# Patient Record
Sex: MALE | Race: WHITE | URBAN - METROPOLITAN AREA
[De-identification: names, ages, dates, MRNs, and addresses within clinical notes are randomized per-mention and may not be internally consistent; named-entity substitution may affect disease eponyms.]

---

## 2023-06-20 ENCOUNTER — OFFICE VISIT (OUTPATIENT)
Dept: URBAN - METROPOLITAN AREA CLINIC 46 | Facility: CLINIC | Age: 58
End: 2023-06-20
Payer: COMMERCIAL

## 2023-06-20 DIAGNOSIS — H02.834 DERMATOCHALASIS OF LEFT UPPER LID: ICD-10-CM

## 2023-06-20 DIAGNOSIS — H16.143 PUNCTATE KERATITIS, BILATERAL: ICD-10-CM

## 2023-06-20 DIAGNOSIS — H52.4 PRESBYOPIA: ICD-10-CM

## 2023-06-20 DIAGNOSIS — H25.13 AGE-RELATED NUCLEAR CATARACT, BILATERAL: ICD-10-CM

## 2023-06-20 DIAGNOSIS — H02.831 DERMATOCHALASIS OF RIGHT UPPER LID: ICD-10-CM

## 2023-06-20 DIAGNOSIS — H16.223 KERATOCONJUNCTIVITIS SICCA, BILATERAL: Primary | ICD-10-CM

## 2023-06-20 PROCEDURE — 99204 OFFICE O/P NEW MOD 45 MIN: CPT | Performed by: OPTOMETRIST

## 2023-06-20 ASSESSMENT — INTRAOCULAR PRESSURE
OD: 12
OS: 12

## 2023-06-20 ASSESSMENT — VISUAL ACUITY
OS: 20/20
OD: 20/20

## 2023-06-20 NOTE — IMPRESSION/PLAN
Impression: Keratoconjunctivitis sicca, bilateral: Y16.432. Plan: Dry eyes account for the patient's complaints. There is no evidence of permanent changes to the cornea. Explained condition does not have a cure and will need artificial tears for maintenance, recommended to use 4-6 times a day.

## 2023-06-20 NOTE — IMPRESSION/PLAN
Impression: Age-related nuclear cataract, bilateral: H25.13. Plan: Patient advised there is a cataract, but that visual functioning is good. We will continue to monitor and they are advised to call or return if any new symptoms. [facility] Urology Progress Note     Carlos Tenorio is a 69 y.o. male with history hypogonadism, kidney stones, prostate cancer, RCC, spermatocele, and ED who presents for urologic follow up     He had been on testosterone replacement therapy previously (off x 5 yrs at time of initial eval) and was referred to me initially for finding of renal mass found on workup for LINSEY after episode of severe colitis and dehydration. A PSA checked at the time of his colon infection was found to be elevated at 7.5, which delayed resuming his testosterone therapy for his hypogonadism. Recheck of PSA in June 2017 was 2.2 with 19% free, though TRT deferred until recovery from partial nephrectomy for the renal mass found as above.       He underwent left robotic partial nephrectomy, with concurrent left renal cyst decortication on 8/2/17 for an upper pole 2-3cm exophytic enhancing renal mass, adjacent to larger simple renal cyst. Pathology: [id number] clear cell RCC. 2.5cm. Negative margins. Grade 2. No sarcomatoid or rhabdoid features. Benign renal cyst     Also has a history of calcium oxalate stones and uric acid stones in the past and has had prior bilateral ESWL 10 years prior.    By report, a 24-hour urine in October 2012 had high oxalate and low urine pH. Takes allopurinol.     He did also have large spermatocele on the right, noted to be 5.1 cm on 4/20/17 ultrasound, and previously decreased in size to almost nothing after resuming TRT, which he did at 200mg Q2 wks 1 month after his partial nephrectomy     Surveillance CT 2/5/18 for his T1a resected RCC performed (as well as screening for nephrolithiasis) noting punctate R upper pole and 4mm R lower pole stone, no evidence recurrence. CXR June 2017 negative. (previous CT stone protocol 5/24/17: 3 stones the largest of which is present within the lower pole and measures 3 mm.)  For interim doubling of his PSA from 2.2 to 4.4 after first 3 months of TRT with interval rise to 4.9  and recommended prostate biopsy.  TRUS bx 2/27/18. Vol 56.6. Path 14 cores benign. Elected to try Viagra again for his ED, as had not been using it properly/on empty stomach previously. Resumed TRT  6/19/18: PSA 3.9, good energy and libido with TRT and great erections without viagra. 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 No worsening LUTS     PSA then lisa on TRT again to 4.5 in Dec 2018, so send confirmMDx of 2/18 biopsy which was +LTZ indicating 28% chance finding prostate ca (19% low grade, 10% G7+)   3T MRI at [facility] 12/26/18: 58mL prostate with 3mm intravesical extension   PIRAD-3 index lesion: 8x6mm posterior lateral peripheral zone of mid gland on right. 14mm from midline and 2mm from capsule. No evidence of EPE.  - heterogenous on T2 with indistinct margins with no masslike lesion but focal moderately hypointense on ADC, mildly hyperintense on DWI, and evaluation for masslike hyperemia --> Repeat prostate biopsy 3/19/19: volume 66.6g. PATHOLOGY: 3+3=6 in 2 out of 17 cores: 5% LM medial, 10% LA lateral  After biopsy, noted AUA SS 1/0. Elected AS for his very low risk CaP. 100mg viagra Rxed to [facility]. Held TRT. Planned annual CaP and RCC surveillance with interim psa     PSA 10/17/19 was 3.3, and 5/29/20 was stable at 3.2 with Cr 1.0, eGFR >60  6/11/20 ASHLEE negative though visualization of entire L kidney poor with known cysts. CXR negative.  He deferred VV follow up in June/July and presents today for follow up evaluation noting:  No trouble urinating.   Denies bothersome LUTS  AUA SS: 4/1 (1:  Emptying, frequency, urgency, sleeping)  No hematuria, dysuria.  Has had enlarging R spermatocele - problem in past  Has been getting bigger since discontinuing testosterone  Notices it but no pain or discomfort  Viagra still working for him   for ester/[place]. Mostly mobile equipment and safety often needed in person so challenged working from home/virtually        ROS: A comprehensive 10 system review  "was performed and is negative except as noted above in HPI     PHYSICAL EXAM:         Vitals:     09/08/20 1049   BP: (!) 174/81   Pulse: 64      Body mass index is 37.29 kg/m². Weight: 108 kg (238 lb 1.6 oz) Height: 5' 7" (170.2 cm)         General: Alert, cooperative, no distress, appears stated age   Head: Normocephalic, without obvious abnormality, atraumatic   Eyes: PERRL, conjunctiva/corneas clear   Lungs: Respirations unlabored   Heart: Warm and well perfused   Abdomen: soft NT ND, lap incisions well healed, no hernia  : circ phallus, no plaques lesions, bilat desc testes palpably normal though has moderate approximate 3 cm to 4 cm right epididymal cyst versus spermatocele, nontender to palpation  TAHIRA:  30-35 g smooth nonnodular with prominence of right apex and mild asymmetry  Extremities: Extremities normal, atraumatic, no cyanosis or edema   Skin: Skin color, texture, turgor normal, no rashes or lesions   Psych: Appropriate   Neurologic: Non-focal         Recent Results         Recent Results (from the past 336 hour(s))   POCT URINE DIPSTICK WITHOUT MICROSCOPE     Collection Time: 09/08/20 11:05 AM   Result Value Ref Range     Color, UA Yellow       Spec Grav UA 1.030       pH, UA 5       WBC, UA tr       Nitrite, UA neg       Protein neg       Glucose, UA neg       Ketones, UA neg       Urobilinogen, UA 0.2       Bilirubin neg       Blood, UA neg       Clarity, UA Clear             ASSESSMENT   1. Prostate cancer  POCT URINE DIPSTICK WITHOUT MICROSCOPE     Prostate Specific Antigen, Diagnostic     Case Request Operating Room: BIOPSY, PROSTATE, RECTAL APPROACH, WITH US GUIDANCE     Place in Outpatient   2. History of renal cell cancer  POCT URINE DIPSTICK WITHOUT MICROSCOPE   3. Spermatocele  POCT URINE DIPSTICK WITHOUT MICROSCOPE   4. History of kidney stones  POCT URINE DIPSTICK WITHOUT MICROSCOPE         Plan    He has been on active surveillance for prostate cancer, low-grade [place] 6, since " diagnosis in March of 2019.  We did review active surveillance, with PSA surveillance every 6 months, and confirmatory 1 year biopsy targeting the areas of previous positivity.  If pathology is stable at that time and he is a candidate to continue active surveillance, will continue PSA screening every 6 months and can space evaluations out every 2-3 years and alternate with MRI and biopsy, as long as PSA remains stable.  Interim evaluations in the case of acute PSA rise.  His PSA has been quite stable since time of diagnosis but was last checked in May, and would be due again around October or November.  We have gone ahead and scheduled his surveillance biopsy for 10/20/20 and will repeat PSA 1 week prior for updated value at time of restaging.  Procedure in detail of prostate biopsy including preparatory instructions were all reviewed with the patient and all questions answered.  Preparatory instruction handout was provided and antibiotics sent to pharmacy.     We did also review surveillance protocols for his left renal cell carcinoma completely resected via partial nephrectomy in August of 2017, about 3 years ago.  Pathology with negative margins and favorable with complete resection and therefore unlikely to have recurrence.  Initial staging CT was negative and have followed with ultrasound, though he does have multiple cysts on the left kidney and some motion artifact on recent ultrasound did not get a great picture of his left kidney, and as he is at the end of his 3 year annual surveillance window we did discuss updating with cross-sectional imaging in getting a CT of the abdomen with and without IV contrast to fully evaluate the kidneys for any signs of recurrence.  Will add on pelvis as well given his surveillance for prostate cancer to make sure there are no changes are clinically significant lymphadenopathy in the pelvis.  CT of the abdomen and pelvis with and without IV contrast will be planned 1 week  prior to biopsy at the time of PSA, and repeat creatinine will be drawn on arrival as well     As well, has had no interim kidney stones, and his updated CT scan, in the non contrasted phase, will help delineate if there is any current urinary stone burden  Reviewed recommendations for stone prevention such as adequate daily hydration to produce goal daily UOP > 2L, low salt low sodium moderate protein diet, avoiding tea and other oxalate rich foods, and use of fresh lemon as citrate is a natural stone inhibitor.     He does have recurrence of his right-sided spermatocele versus epididymal cyst.  It is enlarging in size slowly over the last 1-2 years since discontinuing testosterone and is currently not causing any pain or discomfort.  We did review surveillance with scrotal support and discussed it is benign in etiology.  Has no effect on sexual function, and reassured.  Briefly discussed spermatocelectomy and surgical procedure as well as all risks and benefits.  At this time would like to observe.  If continues to enlarge or causes significant discomfort may consider spermatocelectomy in the future.  Will continue to follow at our follow-up visit.     Doing well in the absence of testosterone replacement therapy.  Stable.  Viagra for ED.  Again discuss the contraindication of testosterone replacement therapy with active prostate cancer.     All questions the patient had were answered in detail and he is agreeable to the treatment plan. 40 mins spent in encounter over half in counseling  In summary:  Restaging prostate biopsy for active surveillance on 10/20/20, prior to which will update his PSA as well as update CT scan for surveillance of his renal cell carcinoma history and extend imaging into pelvis for any clinical update and assessment of lymphadenopathy or progression of prostate cancer (not suspected).    acceptable for asc